# Patient Record
Sex: MALE | Race: WHITE | Employment: FULL TIME | URBAN - METROPOLITAN AREA
[De-identification: names, ages, dates, MRNs, and addresses within clinical notes are randomized per-mention and may not be internally consistent; named-entity substitution may affect disease eponyms.]

---

## 2020-08-28 ENCOUNTER — OFFICE VISIT (OUTPATIENT)
Dept: FAMILY MEDICINE | Facility: CLINIC | Age: 25
End: 2020-08-28
Payer: COMMERCIAL

## 2020-08-28 VITALS
OXYGEN SATURATION: 99 % | HEIGHT: 69 IN | BODY MASS INDEX: 29.12 KG/M2 | SYSTOLIC BLOOD PRESSURE: 134 MMHG | RESPIRATION RATE: 16 BRPM | HEART RATE: 83 BPM | TEMPERATURE: 98.5 F | DIASTOLIC BLOOD PRESSURE: 81 MMHG | WEIGHT: 196.6 LBS

## 2020-08-28 DIAGNOSIS — Z48.02 ENCOUNTER FOR REMOVAL OF SUTURES: Primary | ICD-10-CM

## 2020-08-28 PROCEDURE — 99207 ZZC NO BILLABLE SERVICE THIS VISIT: CPT | Performed by: FAMILY MEDICINE

## 2020-08-28 ASSESSMENT — MIFFLIN-ST. JEOR: SCORE: 1862.15

## 2020-08-28 NOTE — PROGRESS NOTES
Subjective     Jensen Padilla is a 25 year old male who presents to clinic today for the following health issues:    HPI       ED/UC Followup:    Facility:  [facility]  Date of visit: 8/15/20  Reason for visit: laceration, fall  Current Status: doing well, healing good. No pus         Procedure:   PROCEDURE: Laceration Repair   INDICATIONS: Laceration   PROCEDURE PROVIDER: Ginger Bernabe PA-C   SITE: Right uooer back   TYPE/SIZE: A complex clean 20 cm laceration.   FUNCTIONAL ASSESSMENT: Distally/surrounding area sensation, circulation and motor are intact.   MEDICATION: Lidocaine 1% plain. Injecting 15 mls.   PREPARATION: Irrigation and Scrubbing with Normal Saline and Shur Clens   DEBRIDEMENT: wound explored, no foreign body found   CLOSURE: Wound was closed in two layers. Subcutaneous layer closed with 4-0 Vicryl using interrupted sutures. Skin closed with 4-0 Prolene using interrupted sutures.  Total number of sutures/staples placed: 25     PROCEDURE: Laceration Repair   INDICATIONS: Laceration   PROCEDURE PROVIDER: Ginger Bernabe PA-C   SITE: Right upper back   TYPE/SIZE: A complex clean 15 cm laceration.   FUNCTIONAL ASSESSMENT: Distally/surrounding area sensation, circulation and motor are intact.   MEDICATION: Lidocaine 1% plain. Injecting 15 mls.   PREPARATION: Irrigation and Scrubbing with Normal Saline and Shur Clens   DEBRIDEMENT: wound explored, no foreign body found   CLOSURE: Wound was closed in two layers. Subcutaneous layer closed with 4-0 Vicryl using interrupted sutures. Skin closed with 4-0 Prolene using interrupted sutures.  Total number of sutures/staples placed: 2     TDAP 08/15/2020    Reviewed PMH, PSH, FH, Medication and Allergies.     Review of Systems   Constitutional, HEENT, cardiovascular, pulmonary, GI, , musculoskeletal, neuro, skin, endocrine and psych systems are negative, except as otherwise noted.      Objective    There were no vitals taken for this visit.  There is no height or  "weight on file to calculate BMI.  Physical Exam   /81 (BP Location: Right arm, Patient Position: Chair, Cuff Size: Adult Regular)   Pulse 83   Temp 98.5  F (36.9  C) (Tympanic)   Resp 16   Ht 1.753 m (5' 9\")   Wt 89.2 kg (196 lb 9.6 oz)   SpO2 99%   BMI 29.03 kg/m    GENERAL: healthy, alert and no distress  EYES: Eyes grossly normal to inspection  NECK: no asymmetry   BACK/SKIN: right upper back with healing lacerations  RESP: non labored   HENT:  nose and mouth without ulcers or lesions  PSYCH: normal mood     No results found for this or any previous visit (from the past 24 hour(s)).        Assessment & Plan     1. Encounter for removal of sutures  - Area healed. Sutures were removed. Last few were removed by Kasey Lepe RN.          Nubia Mcfarland MD  [facility]    "

## 2021-08-03 ENCOUNTER — OFFICE VISIT (OUTPATIENT)
Dept: FAMILY MEDICINE | Facility: CLINIC | Age: 26
End: 2021-08-03
Payer: COMMERCIAL

## 2021-08-03 VITALS
BODY MASS INDEX: 31.69 KG/M2 | DIASTOLIC BLOOD PRESSURE: 78 MMHG | OXYGEN SATURATION: 99 % | SYSTOLIC BLOOD PRESSURE: 130 MMHG | HEART RATE: 78 BPM | WEIGHT: 214.6 LBS

## 2021-08-03 DIAGNOSIS — K13.70 ORAL LESION: Primary | ICD-10-CM

## 2021-08-03 PROCEDURE — 99213 OFFICE O/P EST LOW 20 MIN: CPT | Performed by: FAMILY MEDICINE

## 2021-08-03 NOTE — PATIENT INSTRUCTIONS
"If this area grows and/or becomes more painful, please let me know and I will put in a referral to see an ear nose and throat specialist in out system    --     Our goal is to discuss this at least every five years with all patients starting at age 18.  Advanced directives are a document that lets us know who talks for you and what your desires are in a medical situation where you are unable to talk for yourself    Here is a useful web site that includes a link to the honoring choices form (under how do I document my choices?):     https://www.[email or web address]/our-community-[email or web address]/honoring-choices    The three must haves for this form are  1) who speaks for you (health care agent if you cannot speak for yourself, and what 'montano' they have  2) what interventions you want if yo are permanently unconscious  3) making this legal - either by notary, or by two signatures of witnesses, neither of who is your health care agent    There are other parts to the form that are optional    No rush for this! Just something we are supposed to discuss every so often.    If you complete the form, and can make a pdf of it, you can send me a message with that pdf attached and it will become part of your chart here; if you cannot make an electronic copy to send by [employer], mailing the document is also fine.  You might also consider putting it on your fridge under \"Emergency Instructions.,\" as well as having a copy for yourself and your family.    Please let me know if you have any questions.    Marilyn Soares MD    "

## 2021-08-03 NOTE — PROGRESS NOTES
"Assessment and Plan    Oral lesion  Appears benign - reassured       Patient Instructions   If this area grows and/or becomes more painful, please let me know and I will put in a referral to see an ear nose and throat specialist in out system    --     Our goal is to discuss this at least every five years with all patients starting at age 18.  Advanced directives are a document that lets us know who talks for you and what your desires are in a medical situation where you are unable to talk for yourself    Here is a useful web site that includes a link to the honoring choices form (under how do I document my choices?):     https://[email or web address]/our-community-[email or web address]/honoring-choices    The three must haves for this form are  1) who speaks for you (health care agent if you cannot speak for yourself, and what 'montano' they have  2) what interventions you want if yo are permanently unconscious  3) making this legal - either by notary, or by two signatures of witnesses, neither of who is your health care agent    There are other parts to the form that are optional    No rush for this! Just something we are supposed to discuss every so often.    If you complete the form, and can make a pdf of it, you can send me a message with that pdf attached and it will become part of your chart here; if you cannot make an electronic copy to send by [employer], mailing the document is also fine.  You might also consider putting it on your fridge under \"Emergency Instructions.,\" as well as having a copy for yourself and your family.    Please let me know if you have any questions.    Marilyn Soares MD       Return for if symptoms fail to improve or worsen.    Marilyn Soares MD     -------------------------------------------    Jensen does not have a regular PCP and is new at this clinic, though notably he did see my former colleague Dr. Shipman at [facility] (where I also worked, now close).  He has not ongoing issus    On the " inside of his jaw he has a red bump that showed up in March or April - seemed to grow overnight or did not pay attentions  No pain. Hasn't grown since he first noticed it    Has [place]er[place] good about brushing and flossing          No current outpatient medications on file.     No current facility-administered medications for this visit.         Health Maintenance Due   Topic Date Due     PREVENTIVE CARE VISIT  Never done     Health Maintenance reviewed - Has HIV and hepatitis C checked 2 years ago - updated    The history section was last reviewed by Bibiana Berg LPN on Aug 3, 2021.    History   Smoking Status     Never Smoker   Smokeless Tobacco     Never Used       Social History    Substance and Sexual Activity      Alcohol use: Yes        Comment: occ.        /78 (BP Location: Left arm, Patient Position: Sitting, Cuff Size: Adult Small)   Pulse 78   Wt 97.3 kg (214 lb 9.6 oz)   SpO2 99%   BMI 31.69 kg/m    Body mass index is 31.69 kg/m .     EXAM:    General appearance - alert, well appearing, and in no distress  Mental status - normal mood, behavior, speech, dress, motor activity, and thought processes  Mouth - mucous membranes moist, pharynx normal without lesions and Very mild swelling of left sublingual papilla  Neck - supple, no significant adenopathy

## 2021-08-15 ENCOUNTER — HEALTH MAINTENANCE LETTER (OUTPATIENT)
Age: 26
End: 2021-08-15

## 2021-10-11 ENCOUNTER — HEALTH MAINTENANCE LETTER (OUTPATIENT)
Age: 26
End: 2021-10-11

## 2022-09-24 ENCOUNTER — HEALTH MAINTENANCE LETTER (OUTPATIENT)
Age: 27
End: 2022-09-24

## 2023-10-14 ENCOUNTER — HEALTH MAINTENANCE LETTER (OUTPATIENT)
Age: 28
End: 2023-10-14